# Patient Record
Sex: MALE | URBAN - METROPOLITAN AREA
[De-identification: names, ages, dates, MRNs, and addresses within clinical notes are randomized per-mention and may not be internally consistent; named-entity substitution may affect disease eponyms.]

---

## 2020-01-14 ENCOUNTER — APPOINTMENT (RX ONLY)
Dept: URBAN - METROPOLITAN AREA CLINIC 361 | Facility: CLINIC | Age: 70
Setting detail: DERMATOLOGY
End: 2020-01-14

## 2020-01-14 DIAGNOSIS — L24 IRRITANT CONTACT DERMATITIS: ICD-10-CM

## 2020-01-14 DIAGNOSIS — L30.9 DERMATITIS, UNSPECIFIED: ICD-10-CM

## 2020-01-14 DIAGNOSIS — L92.3 FOREIGN BODY GRANULOMA OF THE SKIN AND SUBCUTANEOUS TISSUE: ICD-10-CM

## 2020-01-14 DIAGNOSIS — L82.1 OTHER SEBORRHEIC KERATOSIS: ICD-10-CM

## 2020-01-14 PROBLEM — Z92.3 PERSONAL HISTORY OF IRRADIATION: Status: ACTIVE | Noted: 2020-01-14

## 2020-01-14 PROBLEM — I10 ESSENTIAL (PRIMARY) HYPERTENSION: Status: ACTIVE | Noted: 2020-01-14

## 2020-01-14 PROBLEM — B35.6 TINEA CRURIS: Status: ACTIVE | Noted: 2020-01-14

## 2020-01-14 PROBLEM — L24.9 IRRITANT CONTACT DERMATITIS, UNSPECIFIED CAUSE: Status: ACTIVE | Noted: 2020-01-14

## 2020-01-14 PROBLEM — E13.9 OTHER SPECIFIED DIABETES MELLITUS WITHOUT COMPLICATIONS: Status: ACTIVE | Noted: 2020-01-14

## 2020-01-14 PROCEDURE — ? OTHER

## 2020-01-14 PROCEDURE — ? ELECTRODESICCATION (COSMETIC)

## 2020-01-14 PROCEDURE — ? TREATMENT REGIMEN

## 2020-01-14 PROCEDURE — 99202 OFFICE O/P NEW SF 15 MIN: CPT

## 2020-01-14 PROCEDURE — ? COUNSELING

## 2020-01-14 ASSESSMENT — LOCATION ZONE DERM
LOCATION ZONE: SCALP
LOCATION ZONE: LEG
LOCATION ZONE: FINGER
LOCATION ZONE: NOSE

## 2020-01-14 ASSESSMENT — LOCATION DETAILED DESCRIPTION DERM
LOCATION DETAILED: NASAL INFRATIP
LOCATION DETAILED: RIGHT DISTAL VENTRAL THUMB
LOCATION DETAILED: LEFT DISTAL PRETIBIAL REGION
LOCATION DETAILED: RIGHT CENTRAL FRONTAL SCALP

## 2020-01-14 ASSESSMENT — LOCATION SIMPLE DESCRIPTION DERM
LOCATION SIMPLE: RIGHT SCALP
LOCATION SIMPLE: LEFT PRETIBIAL REGION
LOCATION SIMPLE: RIGHT THUMB
LOCATION SIMPLE: NOSE

## 2020-01-14 NOTE — PROCEDURE: OTHER
Detail Level: Detailed
Other (Free Text): Patient states gets irritation in crural folds and scrotum.  Today limited exam as wearing powder on area.  Given resolving with treatment he will continue with this . I discussed blow drying area and antiperspirant as options to add if no longer under good control
Other (Free Text): Did not locate foreign body while paring down today.  Believes has splinter when injured against wood stick.  Patient will RTC if he feels splinter again.  After paring all symptoms resolved
Other (Free Text): Likely from CPAP machine. Dermatitis not present today.
Note Text (......Xxx Chief Complaint.): This diagnosis correlates with the
Other (Free Text): Has history of DM and states dermatitis on the shin has previously attributed to this.  Also develops insect bites on legs he has scratched at and has caused discoloration.  Today small thickened hyperpigmented area will try TS with fu if persistent lesion

## 2020-01-14 NOTE — PROCEDURE: ELECTRODESICCATION (COSMETIC)
Consent: The patient's consent was obtained including but not limited to risks of crusting, scabbing, blistering, scarring, darker or lighter pigmentary change, recurrence, incomplete removal and infection.
Detail Level: Detailed
Price (Use Numbers Only, No Special Characters Or $): 0
Post-Care Instructions: Keep area clean with gentle cleanser than apply aquaphor until healed
Nielsen: 1

## 2020-01-14 NOTE — HPI: SKIN LESIONS
How Severe Is Your Skin Lesion?: mild
Have Your Skin Lesions Been Treated?: not been treated
Is This A New Presentation, Or A Follow-Up?: Skin Lesion
Additional History: Patient states that it comes and goes and gets worse if it rubs on boots.
Additional History: Spot has been present for months.
Additional History: Patient thinks he got a splinter that has been stuck in thumb for a while.

## 2020-01-14 NOTE — PROCEDURE: TREATMENT REGIMEN
Initiate Treatment: Impoyz bid x 2-4 weeks then sparingly PRN.
Detail Level: Simple
Continue Regimen: Triamcinolone 0.025 cream prn sparingly
Otc Regimen: Lotrimin prn
Plan: RTC if not resolved within a month.

## 2020-01-14 NOTE — HPI: RASH
How Severe Is Your Rash?: mild
Is This A New Presentation, Or A Follow-Up?: Rash
Additional History: Patient has previous history of jock itch which he uses spray for.   States almost clear at this point with lotrimin like powder
What Type Of Note Output Would You Prefer (Optional)?: Standard Output
Additional History: Patient states he gets a rash that is almost like dry, chapped nose from his CPAP. he has been prescribed tmc for it.   Well controlled and not present today